# Patient Record
Sex: FEMALE | Race: WHITE | NOT HISPANIC OR LATINO | Employment: FULL TIME | URBAN - METROPOLITAN AREA
[De-identification: names, ages, dates, MRNs, and addresses within clinical notes are randomized per-mention and may not be internally consistent; named-entity substitution may affect disease eponyms.]

---

## 2022-12-07 ENCOUNTER — OFFICE VISIT (OUTPATIENT)
Dept: UROGYNECOLOGY | Facility: CLINIC | Age: 52
End: 2022-12-07
Payer: COMMERCIAL

## 2022-12-07 VITALS
HEIGHT: 64 IN | DIASTOLIC BLOOD PRESSURE: 94 MMHG | WEIGHT: 145.75 LBS | SYSTOLIC BLOOD PRESSURE: 140 MMHG | BODY MASS INDEX: 24.88 KG/M2

## 2022-12-07 DIAGNOSIS — R15.2 RECTAL URGENCY: ICD-10-CM

## 2022-12-07 DIAGNOSIS — N39.3 FEMALE GENUINE STRESS INCONTINENCE: ICD-10-CM

## 2022-12-07 DIAGNOSIS — R39.15 URINARY URGENCY: Primary | ICD-10-CM

## 2022-12-07 DIAGNOSIS — R15.1 FECAL SOILING: ICD-10-CM

## 2022-12-07 DIAGNOSIS — B97.7 HIGH RISK HPV INFECTION: ICD-10-CM

## 2022-12-07 DIAGNOSIS — N32.81 URGENCY-FREQUENCY SYNDROME: ICD-10-CM

## 2022-12-07 DIAGNOSIS — R63.5 WEIGHT GAIN: ICD-10-CM

## 2022-12-07 PROCEDURE — 99204 PR OFFICE/OUTPT VISIT, NEW, LEVL IV, 45-59 MIN: ICD-10-PCS | Mod: 25,S$GLB,, | Performed by: OBSTETRICS & GYNECOLOGY

## 2022-12-07 PROCEDURE — 1159F PR MEDICATION LIST DOCUMENTED IN MEDICAL RECORD: ICD-10-PCS | Mod: CPTII,S$GLB,, | Performed by: OBSTETRICS & GYNECOLOGY

## 2022-12-07 PROCEDURE — 1159F MED LIST DOCD IN RCRD: CPT | Mod: CPTII,S$GLB,, | Performed by: OBSTETRICS & GYNECOLOGY

## 2022-12-07 PROCEDURE — 3077F SYST BP >= 140 MM HG: CPT | Mod: CPTII,S$GLB,, | Performed by: OBSTETRICS & GYNECOLOGY

## 2022-12-07 PROCEDURE — 99999 PR PBB SHADOW E&M-NEW PATIENT-LVL V: ICD-10-PCS | Mod: PBBFAC,,, | Performed by: OBSTETRICS & GYNECOLOGY

## 2022-12-07 PROCEDURE — 87086 URINE CULTURE/COLONY COUNT: CPT | Performed by: OBSTETRICS & GYNECOLOGY

## 2022-12-07 PROCEDURE — 51701 INSERT BLADDER CATHETER: CPT | Mod: S$GLB,,, | Performed by: OBSTETRICS & GYNECOLOGY

## 2022-12-07 PROCEDURE — 3080F DIAST BP >= 90 MM HG: CPT | Mod: CPTII,S$GLB,, | Performed by: OBSTETRICS & GYNECOLOGY

## 2022-12-07 PROCEDURE — 51701 PR INSERTION OF NON-INDWELLING BLADDER CATHETERIZATION FOR RESIDUAL UR: ICD-10-PCS | Mod: S$GLB,,, | Performed by: OBSTETRICS & GYNECOLOGY

## 2022-12-07 PROCEDURE — 1160F PR REVIEW ALL MEDS BY PRESCRIBER/CLIN PHARMACIST DOCUMENTED: ICD-10-PCS | Mod: CPTII,S$GLB,, | Performed by: OBSTETRICS & GYNECOLOGY

## 2022-12-07 PROCEDURE — 99204 OFFICE O/P NEW MOD 45 MIN: CPT | Mod: 25,S$GLB,, | Performed by: OBSTETRICS & GYNECOLOGY

## 2022-12-07 PROCEDURE — 3008F BODY MASS INDEX DOCD: CPT | Mod: CPTII,S$GLB,, | Performed by: OBSTETRICS & GYNECOLOGY

## 2022-12-07 PROCEDURE — 3077F PR MOST RECENT SYSTOLIC BLOOD PRESSURE >= 140 MM HG: ICD-10-PCS | Mod: CPTII,S$GLB,, | Performed by: OBSTETRICS & GYNECOLOGY

## 2022-12-07 PROCEDURE — 99999 PR PBB SHADOW E&M-NEW PATIENT-LVL V: CPT | Mod: PBBFAC,,, | Performed by: OBSTETRICS & GYNECOLOGY

## 2022-12-07 PROCEDURE — 3008F PR BODY MASS INDEX (BMI) DOCUMENTED: ICD-10-PCS | Mod: CPTII,S$GLB,, | Performed by: OBSTETRICS & GYNECOLOGY

## 2022-12-07 PROCEDURE — 1160F RVW MEDS BY RX/DR IN RCRD: CPT | Mod: CPTII,S$GLB,, | Performed by: OBSTETRICS & GYNECOLOGY

## 2022-12-07 PROCEDURE — 3080F PR MOST RECENT DIASTOLIC BLOOD PRESSURE >= 90 MM HG: ICD-10-PCS | Mod: CPTII,S$GLB,, | Performed by: OBSTETRICS & GYNECOLOGY

## 2022-12-07 RX ORDER — ROSUVASTATIN CALCIUM 10 MG/1
10 TABLET, COATED ORAL
COMMUNITY
Start: 2022-10-03

## 2022-12-07 RX ORDER — TROSPIUM CHLORIDE 20 MG/1
20 TABLET, FILM COATED ORAL 2 TIMES DAILY PRN
Qty: 60 TABLET | Refills: 11 | Status: SHIPPED | OUTPATIENT
Start: 2022-12-07 | End: 2023-01-16 | Stop reason: SDUPTHER

## 2022-12-07 RX ORDER — PROGESTERONE 200 MG/1
200 CAPSULE ORAL DAILY
COMMUNITY

## 2022-12-07 RX ORDER — TEMAZEPAM 22.5 MG/1
CAPSULE ORAL
COMMUNITY
Start: 2022-11-07

## 2022-12-07 RX ORDER — ONDANSETRON 4 MG/1
4 TABLET, ORALLY DISINTEGRATING ORAL EVERY 8 HOURS PRN
COMMUNITY
Start: 2022-11-14

## 2022-12-07 RX ORDER — ESTRADIOL 0.1 MG/G
CREAM VAGINAL
Qty: 42.5 G | Refills: 11 | Status: SHIPPED | OUTPATIENT
Start: 2022-12-07

## 2022-12-07 RX ORDER — BUSPIRONE HYDROCHLORIDE 10 MG/1
10 TABLET ORAL 2 TIMES DAILY
COMMUNITY
Start: 2022-11-03 | End: 2023-04-26

## 2022-12-07 RX ORDER — TRAZODONE HYDROCHLORIDE 150 MG/1
TABLET ORAL
COMMUNITY
Start: 2022-11-03

## 2022-12-07 RX ORDER — AMOXICILLIN 500 MG
CAPSULE ORAL DAILY
COMMUNITY

## 2022-12-07 RX ORDER — MELOXICAM 15 MG/1
15 TABLET ORAL
COMMUNITY
Start: 2022-10-03

## 2022-12-07 RX ORDER — VORTIOXETINE 10 MG/1
TABLET, FILM COATED ORAL
COMMUNITY
Start: 2022-11-07

## 2022-12-07 RX ORDER — OMEPRAZOLE 10 MG/1
10 CAPSULE, DELAYED RELEASE ORAL DAILY
COMMUNITY
End: 2023-04-26

## 2022-12-07 NOTE — PATIENT INSTRUCTIONS
Bladder Irritants  Certain foods and drinks have been associated with worsening symptoms of urinary frequency, urgency, urge incontinence, or bladder pain. If you suffer from any of these conditions, you may wish to try eliminating one or more of these foods from your diet and see if your symptoms improve. If bladder symptoms are related to dietary factors, strict adherence to a diet thateliminates the food should bring marked relief in 10 days. Once you are feeling better, you can begin to add foods back into your diet, one at a time. If symptoms return, you will be able to identify the irritant. As you add foods back to your diet it is very important that you drink significant amounts of water.    -----------------------------------------------------------------------------------------------  List of Common Bladder Irritants*  Alcoholic beverages  Apples and apple juice  Cantaloupe  Carbonated beverages  Chili and spicy foods  Chocolate  Citrus fruit  Coffee (including decaffeinated)  Cranberries and cranberry juice  Grapes  Guava  Milk Products: milk, cheese, cottage cheese, yogurt, ice cream  Peaches  Pineapple  Plums  Strawberries  Sugar especially artificial sweeteners, saccharin, aspartame, corn sweeteners, honey, fructose, sucrose, lactose  Tea  Tomatoes and tomato juice  Vitamin B complex  Vinegar  *Most people are not sensitive to ALL of these products; your goal is to find the foods that make YOUR symptoms worse.  ---------------------------------------------------------------------------------------------------    Low-acid fruit substitutions include apricots, papaya, pears and watermelon. Coffee drinkers can drink Kava or other lowacid instant drinks. Tea drinkers can substitute non-citrus herbal and sun brewed teas. Calcium carbonate co-buffered with calcium ascorbate can be substituted for Vitamin C. Prelief is a dietary supplement that works as an acid blocker for the bladder.    Where to get more  information:        Overcoming Bladder Disorders by Tammy Hendrickson and William Grissom, 1990        You Dont Have to Live with Cystitis! By Dinah Villarreal, 1988  http://www.urologymanagement.org/oab  ----------------------------------------------------------------------------------------------    Fiber Information Sheet  Your doctor has recommended that you follow a high fiber diet. The addition of fiber to your diet can make an enormous difference in your bowel control and regularity. Fiber helps people whether they lose stool or have trouble with constipation. Fiber works by bulking the stool and keeping it formed, yet making the movement soft and easy to pass. Fiber helps keep moisture within the stool so that neither diarrhea nor hard stool occurs. Fiber makes the bowels work more regularly, but it is not a laxative. An additional bonus from eating a high fiber diet is that your risk of cancer is reduced, too.    Most of us eat some high fiber foods already, but nearly all of us do not eat the necessary amount. For example, a slice of whole wheat bread contains only about 10% of the daily recommended amount of fiber. This means if you are relying on only whole wheat bread to meet the recommended fiber requirements, you would need to eat  between 10-18 slices every day! Please note that fiber is NOT in any meat or dairy product. It is only found in grains, vegetables and fruits. The recommended daily fiber intake is 20-25 grams. Foods having high fiber content include:     Fiber One Cereal, ½ cup 13.0 g   Eaton beans, ¾ cup 10.4 g   Wheat bran cereal, 1 oz 10.0 g   Kidney beans, ¾ cup 9.3 g   All Bran Cereal, ½ cup 6.0 g   Oat Bran Cereal, hot, 1 oz 4.0 g   Banana, 1medium 3.8 g   Canned pears, ½ cup 3.7 g   3 prunes or ¼ cup raisins 3.5 g   Whole Wheat Total, 1 cup 3.0 g   Carrots, ½ cup 3.2 g   Apple, small 2.8 g   Broccoli, ½ cup 2.8 g   Cauliflower, ½ cup 2.6 g   Oatmeal, 1  oz 2.5 g   Whole Wheat Toast 2.0 g   Cheerios, 1 1/3 cup 2.0 g   Baked potato with skin 2.0 g   Corn, ½ cup 1.9 g   Popcorn, 3 cups 1.9 g   Orange, medium 1.9 g   Granola bar 1.0 g   Lettuce, ½ cup 0.9 g    If you dont think that you can get enough fiber through your everyday diet, there are many good fiber supplements you can take along with eating your high fiber diet. Some of these are: Metamucil (1 heaping teaspoon or 1-2 wafers), Citrucel (1 tablespoon), Fiberall (1-2 wafers or 1 teaspoon), Perdium (2 rounded teaspoons) and 1-2 teaspoons unprocessed bran (to mix with foods)    You may need to use the fiber supplement up to 3-4 times daily to produce normal elimination. Please follow specific package directions or call us for help in regulating the dose. You may notice some bloating and/or increased gas at first. These symptoms can be relieved by adding fiber to your diet slowly. Once your body gets used to this increased fiber, these symptoms will go away.   -------------------------------------------------------    1)  Stress incontinence with increased urgency/frequency:  --urine C&S  --Empty bladder every 2-3 hours.  Empty well: wait a minute, lean forward on toilet.    --Avoid dietary irritants (see sheet).  Keep diary x 3-5 days to determine your irritants.  --start pelvic floor PT. Call to make appt:  Krystal Pittman. [facility] in [place], [place]:  (p) [phone].  (f) [phone].     --URGE: start sanctura 20 mg up to 2x/day.  Takes 2-4 weeks to see if will have effect.  For dry mouth: get sour, sugar free lozenge or gum.    --STRESS:  Pessary vs. Sling.     2)  Fecal smearing/urgency:  --hydrate well  --avoid dietary triggers  Controlling constipation may help bladder urgency/leakage and fiber may better control cholesterol and blood glucose.  Start daily fiber.  Take 1 tsp of fiber powder (psyllium or other sugar-free powder).  Mix in 8 oz of water.  Take x 3-5 days.  Then, increase  fiber by 1 tsp every 3-5 days until stool is easy to pass, well-formed, less smearing.  Stop and continue at that dose.   Do not exceed 6 tsps/day.  May also use over the counter stool softener 1-2 x/day.  AVOID laxatives.    3)  elevated BP:  --keep log of BPs x 2 weeks: check 1st AM, then randomly during day  --send to PCP and discuss next steps     4)  h/o HPV+, persistent:  --started 2020  --do you need colposcopy? Speak with GYN  --consider getting gardasil -- PA placed to see if covered (May not be because >47 yo)    5)  Weight gain:  --consult bariatric medicine    6)  Vaginal atrophy (dryness):    --Use 0.5 gram of estrogen cream in vagina nightly x 2 weeks, then twice a week thereafter.  Can also use dime-sized amount with finger around opening/inner lips at same frequency.   --may help urinary urgency/frequency and vaginal itching/odor    7)  RTC 3-4 months.  VV or in person ok.

## 2022-12-07 NOTE — PROGRESS NOTES
"[facility] - UROGYNECOLOGY  [address] [place] [id number]    Raimundo Hernandez  [id number]  1970    Consulting Physician: Self, Aaareferral   GYN: Dr. Capone ([place])  Primary M.D.: Tammy Love MD    Chief Complaint   Patient presents with    Urinary Frequency     HPI:     1)  UI:  (+) JOHN. (--) UUI  X years.  (+) pads (liner) "in case":1/day, usually minimum wetness.  Daytime frequency: Q 1 hours or less.  Nocturia: Yes: 1-2/night.   (--) dysuria,  (--) hematuria,  (--) frequent UTIs.  (--) complete bladder emptying. +PV urgency with small 2nd volume.     2)  POP:  Absent. (--) vaginal bleeding. (--) vaginal discharge. Does have intermittent itching--takes diflucan often.  (+) sexually active.  (+) dyspareunia.  dryness (+)  Vaginal dryness.  (--) vaginal estrogen use. Taking systemic combo HRT.     3)  BM:  (--) constipation/straining.  (--) chronic diarrhea. (--) hematochezia. +some BRB with hemorrhoids.  (--) fecal incontinence.  (+) fecal smearing/urgency.  Thought had some adherence to hemorrhoids; recently has noted some mucus discharge.   (+) complete evacuation.     Past Medical History  Past Medical History:   Diagnosis Date    Complete placenta previa nos or without hemorrhage, unspecified trimester     HLD (hyperlipidemia)     HPV (human papilloma virus) infection     Hx of abnormal cervical Pap smear     Nerve damage     In legs    Ptosis of eyelid, bilateral      (spontaneous vaginal delivery)     x2   --BP elevated: does not have HTN    Past Surgical History  Past Surgical History:   Procedure Laterality Date     SECTION      Microdisectomy L5     Endometrial ablation     Hysterectomy: No    Past Ob History     x 1 (1st, 3rd).  C/s x 1 (1st for previa/hemorrhage).    Largest infant weight: 9#4oz.   No FAVD. yes episiotomy.      Gynecologic History  LMP: No LMP recorded (lmp unknown). Patient is postmenopausal.  Age of menarche: 12 " yo  Age of menopause: 48 yo  Menstrual history: h/o normal s/p ablation for menorrhagia at 40 yo  Pap test: 1/2021 normal pap/HPV+; 1st HPV+ 2020.  History of abnormal paps: No.  History of STIs:  Yes - HPV+  Mammogram: Date of last: 2022.  Result: Normal per report  Colonoscopy: Date of last: 2021.  Result:  normal per report, +hemorrhoids.  Repeat due:  2031.    DEXA:  Date of last: 49 yo.  Result:  ? osteopenia per report.  Repeat due:  per GYN.     Family History  History reviewed. No pertinent family history.   Colon CA: No  Breast CA: No  GYN CA: No   CA: No    Social History  Social History     Tobacco Use   Smoking Status Former    Types: Cigarettes   Smokeless Tobacco Never     Cessation date: 2019.  PPD:  1 x 30+ years.   Social History     Substance and Sexual Activity   Alcohol Use Not Currently    Alcohol/week: 1.0 standard drink    Types: 1 Glasses of wine per week     The patient is .  Resides in [place] [address].  Employment status: currently employed as RN same day surgery.     Allergies  Review of patient's allergies indicates:  No Known Allergies    Medications  Current Outpatient Medications on File Prior to Visit   Medication Sig Dispense Refill    busPIRone (BUSPAR) 10 MG tablet Take 10 mg by mouth 2 (two) times daily.      meloxicam (MOBIC) 15 MG tablet Take 15 mg by mouth.      multivitamin capsule Take 1 capsule by mouth once daily.      omega-3 fatty acids/fish oil (FISH OIL-OMEGA-3 FATTY ACIDS) 300-1,000 mg capsule Take by mouth once daily.      omeprazole (PRILOSEC) 10 MG capsule Take 10 mg by mouth once daily.      ondansetron (ZOFRAN-ODT) 4 MG TbDL Take 4 mg by mouth every 8 (eight) hours as needed.      progesterone (PROMETRIUM) 200 MG capsule Take 200 mg by mouth once daily.      rosuvastatin (CRESTOR) 10 MG tablet Take 10 mg by mouth.      temazepam (RESTORIL) 22.5 MG capsule Take by mouth.      testosterone and estradiol cyp (ESTRADIOL-TESTOSTERONE IM) Inject into the  "muscle. TOPICAL OINTMENT      traZODone (DESYREL) 150 MG tablet   = 1 tab, Oral, HS, # 90 tab, 1 Refill(s), Pharmacy: [facility], 163, cm, 11/03/22 14:11:00 CDT, Height/Length Measured, 64.9, kg, 11/03/22 14:11:00 CDT, Weight Dosing      vortioxetine (TRINTELLIX) 10 mg Tab   = 1 tab, Oral, Daily, # 90 tab, 1 Refill(s), Pharmacy: [facility], 163, cm, 11/03/22 14:11:00 CDT, Height/Length Measured, 64.9, kg, 11/03/22 14:11:00 CDT, Weight Dosing       No current facility-administered medications on file prior to visit.       Review of Systems A 14 point ROS was reviewed with pertinent positives as noted above in the history of present illness.      Constitutional: negative  Eyes: negative  Endocrine: negative  Gastrointestinal: negative  Cardiovascular: negative  Respiratory: negative  Allergic/Immunologic: negative  Integumentary: negative  Psychiatric: negative  Musculoskeletal: negative   Ear/Nose/Throat: negative  Neurologic: negative  Genitourinary: SEE HPI  Hematologic/Lymphatic: negative   Breast: negative    Urogynecologic Exam  BP (!) 140/94   Ht 5' 4" (1.626 m)   Wt 66.1 kg (145 lb 11.6 oz)   LMP  (LMP Unknown)   BMI 25.01 kg/m²     GENERAL APPEARANCE:  The patient is well-developed, well-nourished.   Neck:  Supple with no thyromegaly, no carotid bruits.  Heart:  Regular rate and rhythm, no murmurs, rubs or gallops.  Lungs:  Clear.  No CVA tenderness.  Abdomen:  Soft, nontender, nondistended, no hepatosplenomegaly.  Incisions:  Pfannenstiel well-healed    PELVIC:    External genitalia:  Normal Bartholins, Skenes and labia bilaterally.    Urethra:  No caruncle, diverticulum or masses.  (+) hypermobility.    Vagina:  Atrophy (+) mild, no bladder masses or tender, no discharge.    Cervix:  normal appearance  Uterus: normal size, contour, position, consistency, mobility, non-tender  Adnexa: Not palpable.    POP-Q:    Deferred.  No obvious POP present with valsalva. "     NEUROLOGIC:  Cranial nerves 2 through 12 intact.  Strength 5/5.  DTRs 2+ lower extremities.  S2 through 4 normal.  Sacral reflexes intact.    EXT: BANDA, 2+ pulses bilaterally, no C/C/E    COUGH STRESS TEST:  negative  KEGEL: 1 /5 (does Valsalva initially)    RECTAL:    External:  Normal, (+) hemorrhoids--old, external, (--) dovetailing.   Internal:   deferred    PVR: 40 mL    Impression    1. Urinary urgency    2. Urgency-frequency syndrome    3. High risk HPV infection    4. Weight gain    5. Female genuine stress incontinence    6. Fecal soiling    7. Rectal urgency        Initial Plan  The patient was counseled regarding these issues. The patient was given a summary sheet containing each of these issues with possible options for evaluation and management. When appropriate, we also reviewed computer-generated diagrams specific to their diagnoses..  All questions were addressed to the patient's satisfaction.    1)  Stress incontinence with increased urgency/frequency:  --urine C&S  --Empty bladder every 2-3 hours.  Empty well: wait a minute, lean forward on toilet.    --Avoid dietary irritants (see sheet).  Keep diary x 3-5 days to determine your irritants.  --start pelvic floor PT. Call to make appt:  Krystal Pittman. [facility] in [place], [place]:  p) [phone].  (f) [phone].     --URGE: start sanctura 20 mg up to 2x/day.  Takes 2-4 weeks to see if will have effect.  For dry mouth: get sour, sugar free lozenge or gum.    --STRESS:  Pessary vs. Sling.     2)  Fecal smearing/urgency:  --hydrate well  --avoid dietary triggers  Controlling constipation may help bladder urgency/leakage and fiber may better control cholesterol and blood glucose.  Start daily fiber.  Take 1 tsp of fiber powder (psyllium or other sugar-free powder).  Mix in 8 oz of water.  Take x 3-5 days.  Then, increase fiber by 1 tsp every 3-5 days until stool is easy to pass, well-formed, less smearing.  Stop and continue at  that dose.   Do not exceed 6 tsps/day.  May also use over the counter stool softener 1-2 x/day.  AVOID laxatives.    3)  elevated BP:  --keep log of BPs x 2 weeks: check 1st AM, then randomly during day  --send to PCP and discuss next steps     4)  h/o HPV+, persistent:  --started 2020  --do you need colposcopy? Speak with GYN  --consider getting gardasil -- PA placed to see if covered (May not be because >45 yo)    5)  Weight gain:  --consult bariatric medicine    6)  Vaginal atrophy (dryness):    --Use 0.5 gram of estrogen cream in vagina nightly x 2 weeks, then twice a week thereafter.  Can also use dime-sized amount with finger around opening/inner lips at same frequency.   --may help urinary urgency/frequency and vaginal itching/odor    7)  RTC 3-4 months.  VV or in person ok.     Approximately 45 min were spent in consult, 90 % in discussion.     Thank you for requesting consultation of your patient.  I look forward to participating in their care.    Sunil Manzanares  Female Pelvic Medicine and Reconstructive Surgery  [facility] [place], [place]

## 2022-12-09 LAB — BACTERIA UR CULT: NO GROWTH

## 2022-12-11 PROBLEM — R15.2 RECTAL URGENCY: Status: ACTIVE | Noted: 2022-12-11

## 2022-12-11 PROBLEM — N32.81 URGENCY-FREQUENCY SYNDROME: Status: ACTIVE | Noted: 2022-12-11

## 2022-12-11 PROBLEM — N39.3 FEMALE GENUINE STRESS INCONTINENCE: Status: ACTIVE | Noted: 2022-12-11

## 2022-12-11 PROBLEM — R15.1 FECAL SOILING: Status: ACTIVE | Noted: 2022-12-11

## 2022-12-12 ENCOUNTER — PATIENT MESSAGE (OUTPATIENT)
Dept: UROGYNECOLOGY | Facility: CLINIC | Age: 52
End: 2022-12-12
Payer: COMMERCIAL

## 2022-12-12 ENCOUNTER — TELEPHONE (OUTPATIENT)
Dept: UROGYNECOLOGY | Facility: CLINIC | Age: 52
End: 2022-12-12
Payer: COMMERCIAL

## 2022-12-12 NOTE — TELEPHONE ENCOUNTER
"Called [facility] pharmacy to check on PA status for Gardasil. Pharmacy is requesting that we send the order for the vaccine like a "regular prescription" they can start working on it for her. Will communicate to Dr. Manzanares.  "

## 2022-12-12 NOTE — TELEPHONE ENCOUNTER
----- Message from Sunil Manzanares MD sent at 12/11/2022 11:50 AM CST -----  Regarding: can we check on PA status?  Patient > 45 yo but has h/o HPV.  Ordered Gardasil.  Can we check to see if it was approved?  I think if you call [facility] pharmacy, they can check? THanks!

## 2022-12-15 ENCOUNTER — PATIENT MESSAGE (OUTPATIENT)
Dept: UROGYNECOLOGY | Facility: CLINIC | Age: 52
End: 2022-12-15
Payer: COMMERCIAL

## 2022-12-16 ENCOUNTER — PATIENT MESSAGE (OUTPATIENT)
Dept: UROGYNECOLOGY | Facility: CLINIC | Age: 52
End: 2022-12-16
Payer: COMMERCIAL

## 2022-12-19 ENCOUNTER — PATIENT MESSAGE (OUTPATIENT)
Dept: UROGYNECOLOGY | Facility: CLINIC | Age: 52
End: 2022-12-19
Payer: COMMERCIAL

## 2022-12-19 NOTE — TELEPHONE ENCOUNTER
Called patient regarding scheduling HPV vaccine. Was able to schedule her in injection clinic in [address]. Patient also states she is currently being evaluated by her regular gyn in [place] for hormone therapy, was told she may need to do vaginal estradiol more than twice weekly to raise estrogen to healthy level. Patient will send us her most recent labs so Dr. Manzanares can evaluate if this is necessary or not.

## 2022-12-23 ENCOUNTER — PATIENT MESSAGE (OUTPATIENT)
Dept: UROGYNECOLOGY | Facility: CLINIC | Age: 52
End: 2022-12-23
Payer: COMMERCIAL

## 2023-01-11 ENCOUNTER — CLINICAL SUPPORT (OUTPATIENT)
Dept: OBSTETRICS AND GYNECOLOGY | Facility: CLINIC | Age: 53
End: 2023-01-11
Payer: COMMERCIAL

## 2023-01-11 DIAGNOSIS — Z23 NEED FOR HPV VACCINATION: Primary | ICD-10-CM

## 2023-01-11 PROCEDURE — 99999 PR PBB SHADOW E&M-EST. PATIENT-LVL I: CPT | Mod: PBBFAC,,,

## 2023-01-11 PROCEDURE — 99999 PR PBB SHADOW E&M-EST. PATIENT-LVL I: ICD-10-PCS | Mod: PBBFAC,,,

## 2023-01-11 PROCEDURE — 90651 9VHPV VACCINE 2/3 DOSE IM: CPT | Mod: S$GLB,,, | Performed by: OBSTETRICS & GYNECOLOGY

## 2023-01-11 PROCEDURE — 90651 HPV VACCINE 9-VALENT 3 DOSE IM: ICD-10-PCS | Mod: S$GLB,,, | Performed by: OBSTETRICS & GYNECOLOGY

## 2023-01-11 PROCEDURE — 90471 HPV VACCINE 9-VALENT 3 DOSE IM: ICD-10-PCS | Mod: S$GLB,,, | Performed by: OBSTETRICS & GYNECOLOGY

## 2023-01-11 PROCEDURE — 90471 IMMUNIZATION ADMIN: CPT | Mod: S$GLB,,, | Performed by: OBSTETRICS & GYNECOLOGY

## 2023-01-11 NOTE — PROGRESS NOTES
Here for Gardasil # 1 OF 3 injection, without complaint at this time. Reports no pain prior to or post injection. Advised to wait in lobby 15 minutes post injection and report any adverse reactions.    Return to clinic in 8 WEEKS for next injection.     Site: LD

## 2023-01-17 DIAGNOSIS — N32.81 URGENCY-FREQUENCY SYNDROME: ICD-10-CM

## 2023-01-17 RX ORDER — TROSPIUM CHLORIDE 20 MG/1
20 TABLET, FILM COATED ORAL 2 TIMES DAILY PRN
Qty: 180 TABLET | Refills: 3 | Status: SHIPPED | OUTPATIENT
Start: 2023-01-17 | End: 2023-04-12

## 2023-01-18 ENCOUNTER — PATIENT MESSAGE (OUTPATIENT)
Dept: UROGYNECOLOGY | Facility: CLINIC | Age: 53
End: 2023-01-18
Payer: COMMERCIAL

## 2023-01-18 DIAGNOSIS — R39.15 URINARY URGENCY: ICD-10-CM

## 2023-01-18 DIAGNOSIS — R39.15 URINARY URGENCY: Primary | ICD-10-CM

## 2023-01-18 RX ORDER — VIBEGRON 75 MG/1
75 TABLET, FILM COATED ORAL DAILY
Qty: 30 TABLET | Refills: 11 | Status: SHIPPED | OUTPATIENT
Start: 2023-01-18 | End: 2023-04-12

## 2023-01-18 RX ORDER — VIBEGRON 75 MG/1
75 TABLET, FILM COATED ORAL DAILY
Qty: 30 TABLET | Refills: 11 | Status: SHIPPED | OUTPATIENT
Start: 2023-01-18 | End: 2023-01-18 | Stop reason: SDUPTHER

## 2023-01-18 NOTE — TELEPHONE ENCOUNTER
Spoke with patient.   Stopping T due to HTN and will continue E2/P4 per GYN guidance.   Is worried sanctura is causing tachycardia but T can also these issues.   Will CTM, stop T--also just started a B blocker.   Will continue sanctura for now--let us know if issues.   Has follow up Apr 2023.

## 2023-01-24 ENCOUNTER — TELEPHONE (OUTPATIENT)
Dept: BARIATRICS | Facility: CLINIC | Age: 53
End: 2023-01-24
Payer: COMMERCIAL

## 2023-01-24 NOTE — TELEPHONE ENCOUNTER
Notified patient of the date & time of financial phone call appt.  Pt aware appt is a telephone call.    Dashboard updated  Appt , 01/25/2023

## 2023-03-08 ENCOUNTER — CLINICAL SUPPORT (OUTPATIENT)
Dept: OBSTETRICS AND GYNECOLOGY | Facility: CLINIC | Age: 53
End: 2023-03-08
Payer: COMMERCIAL

## 2023-03-08 DIAGNOSIS — Z23 NEED FOR HPV VACCINATION: Primary | ICD-10-CM

## 2023-03-08 PROCEDURE — 90651 9VHPV VACCINE 2/3 DOSE IM: CPT | Mod: S$GLB,,, | Performed by: OBSTETRICS & GYNECOLOGY

## 2023-03-08 PROCEDURE — 90651 PR HPV/ HUMAN PAPILLOMA VACC, 9-VAL(PF) 0.5 ML IM: ICD-10-PCS | Mod: S$GLB,,, | Performed by: OBSTETRICS & GYNECOLOGY

## 2023-03-08 PROCEDURE — 99999 PR PBB SHADOW E&M-EST. PATIENT-LVL I: ICD-10-PCS | Mod: PBBFAC,,,

## 2023-03-08 PROCEDURE — 90471 IMMUNIZATION ADMIN: CPT | Mod: S$GLB,,, | Performed by: OBSTETRICS & GYNECOLOGY

## 2023-03-08 PROCEDURE — 90471 PR IMMUNIZ ADMIN,1 SINGLE/COMB VAC/TOXOID: ICD-10-PCS | Mod: S$GLB,,, | Performed by: OBSTETRICS & GYNECOLOGY

## 2023-03-08 PROCEDURE — 99999 PR PBB SHADOW E&M-EST. PATIENT-LVL I: CPT | Mod: PBBFAC,,,

## 2023-03-08 NOTE — PROGRESS NOTES
Here for Gardasil # 2 OF 3 injection, without complaint at this time. Reports no pain prior to or post injection. Advised to wait in lobby 15 minutes post injection and report any adverse reactions.    Return to clinic in 4MONTHS for next injection.     Site: LD

## 2023-04-12 ENCOUNTER — PATIENT MESSAGE (OUTPATIENT)
Dept: UROGYNECOLOGY | Facility: CLINIC | Age: 53
End: 2023-04-12
Payer: COMMERCIAL

## 2023-04-12 ENCOUNTER — TELEPHONE (OUTPATIENT)
Dept: UROGYNECOLOGY | Facility: CLINIC | Age: 53
End: 2023-04-12
Payer: COMMERCIAL

## 2023-04-12 ENCOUNTER — OFFICE VISIT (OUTPATIENT)
Dept: UROGYNECOLOGY | Facility: CLINIC | Age: 53
End: 2023-04-12
Payer: COMMERCIAL

## 2023-04-12 DIAGNOSIS — R39.15 URINARY URGENCY: ICD-10-CM

## 2023-04-12 DIAGNOSIS — N95.0 POST-MENOPAUSAL BLEEDING: Primary | ICD-10-CM

## 2023-04-12 PROCEDURE — 1159F PR MEDICATION LIST DOCUMENTED IN MEDICAL RECORD: ICD-10-PCS | Mod: CPTII,95,, | Performed by: NURSE PRACTITIONER

## 2023-04-12 PROCEDURE — 99499 NO LOS: ICD-10-PCS | Mod: 95,,, | Performed by: NURSE PRACTITIONER

## 2023-04-12 PROCEDURE — 1160F RVW MEDS BY RX/DR IN RCRD: CPT | Mod: CPTII,95,, | Performed by: NURSE PRACTITIONER

## 2023-04-12 PROCEDURE — 1160F PR REVIEW ALL MEDS BY PRESCRIBER/CLIN PHARMACIST DOCUMENTED: ICD-10-PCS | Mod: CPTII,95,, | Performed by: NURSE PRACTITIONER

## 2023-04-12 PROCEDURE — 1159F MED LIST DOCD IN RCRD: CPT | Mod: CPTII,95,, | Performed by: NURSE PRACTITIONER

## 2023-04-12 PROCEDURE — 99499 UNLISTED E&M SERVICE: CPT | Mod: 95,,, | Performed by: NURSE PRACTITIONER

## 2023-04-12 NOTE — TELEPHONE ENCOUNTER
----- Message from Shanae Lux sent at 4/12/2023  9:17 AM CDT -----  Regarding: audio appt  Name of Who is Calling:          What is the request in detail:Pt got a call if she could do her audio appt now. She said no she is at work and can only do 4 pm. Thank you           Can the clinic reply by [facility]:          What Number to Call Back [facility][phone]

## 2023-04-12 NOTE — TELEPHONE ENCOUNTER
Left voice message for pt to give the office a call back at [phone] regarding today's appointment.

## 2023-04-12 NOTE — PROGRESS NOTES
"Established Patient - Audio Only Telehealth Visit     The patient location is: Home--[place]  The chief complaint leading to consultation is: follow up  Visit type: Virtual visit with audio only (telephone)  Total time spent with patient: 20       The reason for the audio only service rather than synchronous audio and video virtual visit was related to technical difficulties or patient preference/necessity.     Each patient to whom I provide medical services by telemedicine is:  (1) informed of the relationship between the physician and patient and the respective role of any other health care provider with respect to management of the patient; and (2) notified that they may decline to receive medical services by telemedicine and may withdraw from such care at any time. Patient verbally consented to receive this service via voice-only telephone call.       HPI: 12/07/2023  1)  UI:  (+) JOHN. (--) UUI  X years.  (+) pads (liner) "in case":1/day, usually minimum wetness.  Daytime frequency: Q 1 hours or less.  Nocturia: Yes: 1-2/night.   (--) dysuria,  (--) hematuria,  (--) frequent UTIs.  (--) complete bladder emptying. +PV urgency with small 2nd volume.      2)  POP:  Absent. (--) vaginal bleeding. (--) vaginal discharge. Does have intermittent itching--takes diflucan often.  (+) sexually active.  (+) dyspareunia.  dryness (+)  Vaginal dryness.  (--) vaginal estrogen use. Taking systemic combo HRT.      3)  BM:  (--) constipation/straining.  (--) chronic diarrhea. (--) hematochezia. +some BRB with hemorrhoids.  (--) fecal incontinence.  (+) fecal smearing/urgency.  Thought had some adherence to hemorrhoids; recently has noted some mucus discharge.   (+) complete evacuation.       Changes since last visit:  1)  Stress incontinence with increased urgency/frequency:  --did not tolerate sanctura (did help, but did not tolerate due to lower leg edema)  --did not go to pelvic floor PT  --+ JOHN if laughing, " sneezing  --denies UUI  --frequency has improved. Voiding every 2 hours-- has decreased water intake from excessive intake     2)  Fecal smearing/urgency:  --improved  --taking fiber daily     3)  elevated BP:  --seeing cardiology  --reports negative stress test  --scheduled for follow up     4)  h/o HPV+, persistent:  --followed by gyn, but hpv was not done     5)  Weight gain:  --consult bariatric medicine     6)  Vaginal atrophy (dryness):    --using vaginal estrogen cream twice daily     7)  postmenopausal bleeding  --taking estrogen 1 mg and progesterone 200 mg daily  --spotting for past 2 months.       1. Post-menopausal bleeding  US Pelvis Comp with Transvag NON-OB (xpd      2. Urinary urgency            Plan   1)  Stress incontinence with increased urgency/frequency:  --Empty bladder every 2-3 hours.  Empty well: wait a minute, lean forward on toilet.    --Avoid dietary irritants (see sheet).  Keep diary x 3-5 days to determine your irritants.  --consider pelvic floor PT  --URGE: did not tolerate sanctura 20 mg   --STRESS:  Pessary vs. Sling.      2)  Fecal smearing/urgency:  --continue fiber supplement     3)  elevated BP:  --follow up with pcp/cardiology     4)  h/o HPV+, persistent/postmenopausal bleeding  --pelvic ultrasound  --follow up with local gyn     5)  Weight gain:  --consult bariatric medicine     6)  Vaginal atrophy (dryness):    --use vaginal estrogen cream twice weekly     7)  RTC 3-4 months.         20 minutes were spent in face to face time with this patient  90 % of this time was spent in counseling and/or coordination of care          This service was not originating from a related E/M service provided within the previous 7 days nor will  to an E/M service or procedure within the next 24 hours or my soonest available appointment.  Prevailing standard of care was able to be met in this audio-only visit.        Loan MERAZ Marchand [facility]  Division of Female Pelvic  Medicine and Reconstructive Surgery  Department of Obstetrics & Gynecology

## 2023-04-12 NOTE — TELEPHONE ENCOUNTER
Pt stated she did her pre check and paid for the visit on yesterday & don't know why it's showing her being checked in. She also stated she was call into work today, it was her off day & can't start v-visit until 4 pm do to her being at work.

## 2023-04-13 ENCOUNTER — PATIENT MESSAGE (OUTPATIENT)
Dept: UROGYNECOLOGY | Facility: CLINIC | Age: 53
End: 2023-04-13
Payer: COMMERCIAL

## 2023-04-21 ENCOUNTER — PATIENT MESSAGE (OUTPATIENT)
Dept: UROGYNECOLOGY | Facility: CLINIC | Age: 53
End: 2023-04-21
Payer: COMMERCIAL

## 2023-04-28 ENCOUNTER — PATIENT MESSAGE (OUTPATIENT)
Dept: UROGYNECOLOGY | Facility: CLINIC | Age: 53
End: 2023-04-28
Payer: COMMERCIAL

## 2023-07-19 ENCOUNTER — CLINICAL SUPPORT (OUTPATIENT)
Dept: OBSTETRICS AND GYNECOLOGY | Facility: CLINIC | Age: 53
End: 2023-07-19
Payer: COMMERCIAL

## 2023-07-19 DIAGNOSIS — Z23 NEED FOR HPV VACCINATION: Primary | ICD-10-CM

## 2023-07-19 PROCEDURE — 90471 HPV VACCINE 9-VALENT 3 DOSE IM: ICD-10-PCS | Mod: S$GLB,,, | Performed by: OBSTETRICS & GYNECOLOGY

## 2023-07-19 PROCEDURE — 99999 PR PBB SHADOW E&M-EST. PATIENT-LVL I: CPT | Mod: PBBFAC,,,

## 2023-07-19 PROCEDURE — 90651 9VHPV VACCINE 2/3 DOSE IM: CPT | Mod: S$GLB,,, | Performed by: OBSTETRICS & GYNECOLOGY

## 2023-07-19 PROCEDURE — 90471 IMMUNIZATION ADMIN: CPT | Mod: S$GLB,,, | Performed by: OBSTETRICS & GYNECOLOGY

## 2023-07-19 PROCEDURE — 99999 PR PBB SHADOW E&M-EST. PATIENT-LVL I: ICD-10-PCS | Mod: PBBFAC,,,

## 2023-07-19 PROCEDURE — 90651 HPV VACCINE 9-VALENT 3 DOSE IM: ICD-10-PCS | Mod: S$GLB,,, | Performed by: OBSTETRICS & GYNECOLOGY

## 2023-07-19 NOTE — PROGRESS NOTES
Here for Gardasil # 3 of 3 injection, without complaint at this time. Reports no pain prior to or post injection. Advised to wait in lobby 15 minutes post injection and report any adverse reactions.  SERIES COMPLETE.     Site: LD